# Patient Record
Sex: MALE | Race: WHITE | NOT HISPANIC OR LATINO
[De-identification: names, ages, dates, MRNs, and addresses within clinical notes are randomized per-mention and may not be internally consistent; named-entity substitution may affect disease eponyms.]

---

## 2017-03-14 ENCOUNTER — APPOINTMENT (OUTPATIENT)
Dept: GERIATRICS | Facility: CLINIC | Age: 82
End: 2017-03-14

## 2017-04-12 ENCOUNTER — RX RENEWAL (OUTPATIENT)
Age: 82
End: 2017-04-12

## 2017-05-13 ENCOUNTER — RX RENEWAL (OUTPATIENT)
Age: 82
End: 2017-05-13

## 2017-09-25 ENCOUNTER — APPOINTMENT (OUTPATIENT)
Dept: DERMATOLOGY | Facility: CLINIC | Age: 82
End: 2017-09-25
Payer: MEDICARE

## 2017-09-25 VITALS — DIASTOLIC BLOOD PRESSURE: 68 MMHG | SYSTOLIC BLOOD PRESSURE: 112 MMHG

## 2017-09-25 DIAGNOSIS — Z56.0 UNEMPLOYMENT, UNSPECIFIED: ICD-10-CM

## 2017-09-25 DIAGNOSIS — H54.7 UNSPECIFIED VISUAL LOSS: ICD-10-CM

## 2017-09-25 DIAGNOSIS — Z87.442 PERSONAL HISTORY OF URINARY CALCULI: ICD-10-CM

## 2017-09-25 DIAGNOSIS — Z91.89 OTHER SPECIFIED PERSONAL RISK FACTORS, NOT ELSEWHERE CLASSIFIED: ICD-10-CM

## 2017-09-25 PROCEDURE — 99203 OFFICE O/P NEW LOW 30 MIN: CPT | Mod: GC

## 2017-09-25 SDOH — ECONOMIC STABILITY - INCOME SECURITY: UNEMPLOYMENT, UNSPECIFIED: Z56.0

## 2017-10-03 ENCOUNTER — APPOINTMENT (OUTPATIENT)
Dept: GERIATRICS | Facility: CLINIC | Age: 82
End: 2017-10-03
Payer: MEDICARE

## 2017-10-03 ENCOUNTER — NON-APPOINTMENT (OUTPATIENT)
Age: 82
End: 2017-10-03

## 2017-10-03 VITALS
RESPIRATION RATE: 18 BRPM | SYSTOLIC BLOOD PRESSURE: 110 MMHG | DIASTOLIC BLOOD PRESSURE: 70 MMHG | HEART RATE: 84 BPM | BODY MASS INDEX: 29.76 KG/M2 | TEMPERATURE: 99.2 F | WEIGHT: 168 LBS | OXYGEN SATURATION: 94 %

## 2017-10-03 DIAGNOSIS — G47.00 INSOMNIA, UNSPECIFIED: ICD-10-CM

## 2017-10-03 PROCEDURE — 93000 ELECTROCARDIOGRAM COMPLETE: CPT

## 2017-10-03 PROCEDURE — G0439: CPT

## 2017-10-03 PROCEDURE — 99214 OFFICE O/P EST MOD 30 MIN: CPT | Mod: 25

## 2017-10-03 RX ORDER — DOCUSATE SODIUM 100 MG/1
100 CAPSULE ORAL
Refills: 0 | Status: ACTIVE | COMMUNITY
Start: 2017-10-03

## 2017-10-03 RX ORDER — MULTIVIT-MIN/FOLIC/VIT K/LYCOP 400-300MCG
25 MCG TABLET ORAL
Refills: 0 | Status: ACTIVE | COMMUNITY
Start: 2017-10-03

## 2017-10-03 RX ORDER — ACETAMINOPHEN 500 MG/1
500 TABLET, COATED ORAL
Refills: 0 | Status: ACTIVE | COMMUNITY
Start: 2017-10-03

## 2017-10-05 LAB
ALBUMIN SERPL ELPH-MCNC: 3.7 G/DL
ALP BLD-CCNC: 156 U/L
ALT SERPL-CCNC: 43 U/L
ANION GAP SERPL CALC-SCNC: 11 MMOL/L
AST SERPL-CCNC: 35 U/L
BASOPHILS # BLD AUTO: 0.06 K/UL
BASOPHILS NFR BLD AUTO: 0.8 %
BILIRUB SERPL-MCNC: 0.6 MG/DL
BUN SERPL-MCNC: 34 MG/DL
CALCIUM SERPL-MCNC: 10.1 MG/DL
CHLORIDE SERPL-SCNC: 102 MMOL/L
CHOLEST SERPL-MCNC: 146 MG/DL
CHOLEST/HDLC SERPL: 2.7 RATIO
CO2 SERPL-SCNC: 25 MMOL/L
CREAT SERPL-MCNC: 1.74 MG/DL
EOSINOPHIL # BLD AUTO: 0.57 K/UL
EOSINOPHIL NFR BLD AUTO: 8.1 %
FOLATE SERPL-MCNC: 19 NG/ML
GLUCOSE SERPL-MCNC: 125 MG/DL
HCT VFR BLD CALC: 39.6 %
HDLC SERPL-MCNC: 55 MG/DL
HGB BLD-MCNC: 13.1 G/DL
IMM GRANULOCYTES NFR BLD AUTO: 0.3 %
LDLC SERPL CALC-MCNC: 53 MG/DL
LYMPHOCYTES # BLD AUTO: 1.66 K/UL
LYMPHOCYTES NFR BLD AUTO: 23.4 %
MAN DIFF?: NORMAL
MCHC RBC-ENTMCNC: 33.1 GM/DL
MCHC RBC-ENTMCNC: 33.9 PG
MCV RBC AUTO: 102.6 FL
MONOCYTES # BLD AUTO: 0.76 K/UL
MONOCYTES NFR BLD AUTO: 10.7 %
NEUTROPHILS # BLD AUTO: 4.01 K/UL
NEUTROPHILS NFR BLD AUTO: 56.7 %
PLATELET # BLD AUTO: 284 K/UL
POTASSIUM SERPL-SCNC: 4.6 MMOL/L
PROT SERPL-MCNC: 6.7 G/DL
RBC # BLD: 3.86 M/UL
RBC # FLD: 14.2 %
SODIUM SERPL-SCNC: 138 MMOL/L
TRIGL SERPL-MCNC: 189 MG/DL
TSH SERPL-ACNC: 2.67 UIU/ML
VIT B12 SERPL-MCNC: 702 PG/ML
WBC # FLD AUTO: 7.08 K/UL

## 2017-10-06 ENCOUNTER — EMERGENCY (EMERGENCY)
Facility: HOSPITAL | Age: 82
LOS: 1 days | Discharge: HOME | End: 2017-10-06
Attending: EMERGENCY MEDICINE | Admitting: EMERGENCY MEDICINE
Payer: MEDICARE

## 2017-10-06 VITALS
DIASTOLIC BLOOD PRESSURE: 88 MMHG | SYSTOLIC BLOOD PRESSURE: 133 MMHG | RESPIRATION RATE: 18 BRPM | OXYGEN SATURATION: 95 % | TEMPERATURE: 98 F | HEART RATE: 86 BPM

## 2017-10-06 VITALS
OXYGEN SATURATION: 98 % | DIASTOLIC BLOOD PRESSURE: 76 MMHG | SYSTOLIC BLOOD PRESSURE: 145 MMHG | RESPIRATION RATE: 20 BRPM | HEART RATE: 84 BPM

## 2017-10-06 PROCEDURE — 99284 EMERGENCY DEPT VISIT MOD MDM: CPT | Mod: GC

## 2017-10-06 PROCEDURE — 72170 X-RAY EXAM OF PELVIS: CPT

## 2017-10-06 PROCEDURE — 99284 EMERGENCY DEPT VISIT MOD MDM: CPT | Mod: 25

## 2017-10-06 PROCEDURE — 70450 CT HEAD/BRAIN W/O DYE: CPT | Mod: 26

## 2017-10-06 PROCEDURE — 70450 CT HEAD/BRAIN W/O DYE: CPT

## 2017-10-06 PROCEDURE — 72170 X-RAY EXAM OF PELVIS: CPT | Mod: 26

## 2017-10-06 PROCEDURE — 71010: CPT | Mod: 26

## 2017-10-06 PROCEDURE — 71045 X-RAY EXAM CHEST 1 VIEW: CPT

## 2017-10-06 NOTE — ED PROVIDER NOTE - MEDICAL DECISION MAKING DETAILS
S/p mechanical fall on aspirin Pt ambulatory. Will get CTH and CXR and pelvic Xray. S/p mechanical fall on aspirin Pt ambulatory. Will get CTH and CXR and pelvic Xray.  ATTD: s/p fall with unknown injury. denies pain. will check ct head as patient on ASA and concern for ICH, check xray chest xray pelvis,

## 2017-10-06 NOTE — ED PROVIDER NOTE - OBJECTIVE STATEMENT
89 YOM on aspirin arrives via EMS s/p hearing a "thud" on the ground possibly pt fell out of bed and was complaining of hip pain after the fall. Pt remembers falling while trying to get out of bed. No loc, no syncope pt states he slipped and fell. Pt usually uses a walker to walk. Pt denies any fevers, chills, pain, SOB. Chest pain, SOB. Pt is currently able to ambulate with assistance.

## 2017-10-06 NOTE — ED ADULT NURSE REASSESSMENT NOTE - NS ED NURSE REASSESS COMMENT FT1
Pt returned from CT to room and awaiting radiology results, MD aware. VS stable, skin intact, IV site benign. Warm blanket provided and lights dimmed. Will continue to monitor.

## 2017-10-06 NOTE — ED PROVIDER NOTE - ATTENDING CONTRIBUTION TO CARE
89 y/o m with pmhx HLD, dementia, presents from [facility]isted [facility] for fall this am from bed. states he was getting up to go to the bathroom and then fell. not amb after the fall. unknown if LOC. no weakness or numbness. no headache. no fever. no chills. no back pain no pain in extremities.   Gen. elderly male, no apparent resp distress, no acute distress from pain, Non toxic   HEENT: EOMI, mmm, nc/at. no hematoma, neck no midline tend. no step off. no racoon no patel sign.   Lungs:  b/l BS. no wheeze no retractions.   CVS: S1S2   back small area of ecchymosis on left upper back. no scapula tenderness. ext  from.   MSK 5/5 x4.   Abd; Soft non tender, non distended   Ext: no edema   Neuro Awake, alert, oriented to person , place and age.  non focal clear speech  able to ambulate in ER with assistance. distal neurovasc intact.

## 2017-10-06 NOTE — ED ADULT NURSE NOTE - OBJECTIVE STATEMENT
Patient a + o x 3 s/p fall from bed, lives at assisted living and someone heard the patient fall out of bed.   patient denies LOC, denies hitting his head. denies right and left hip pain.  Patient has bilateral upper and lower extremity strength and able to lift both legs.  patient states that he uses a walker at home.  Patient oriented to area, made comfortable, safety maintained.

## 2017-10-10 ENCOUNTER — OTHER (OUTPATIENT)
Age: 82
End: 2017-10-10

## 2017-10-10 DIAGNOSIS — E55.9 VITAMIN D DEFICIENCY, UNSPECIFIED: ICD-10-CM

## 2017-10-10 RX ORDER — NICOTINE POLACRILEX 2 MG
600-500 GUM BUCCAL
Qty: 60 | Refills: 5 | Status: ACTIVE | COMMUNITY
Start: 2017-10-10

## 2017-10-10 RX ORDER — MULTIVITAMIN WITH MINERALS
600 TABLET ORAL
Refills: 0 | Status: DISCONTINUED | COMMUNITY
Start: 2017-10-03 | End: 2017-10-10

## 2017-11-15 ENCOUNTER — APPOINTMENT (OUTPATIENT)
Dept: GASTROENTEROLOGY | Facility: CLINIC | Age: 82
End: 2017-11-15

## 2017-11-16 ENCOUNTER — NON-APPOINTMENT (OUTPATIENT)
Age: 82
End: 2017-11-16

## 2017-11-16 ENCOUNTER — APPOINTMENT (OUTPATIENT)
Dept: ELECTROPHYSIOLOGY | Facility: CLINIC | Age: 82
End: 2017-11-16
Payer: MEDICARE

## 2017-11-16 VITALS
BODY MASS INDEX: 26.58 KG/M2 | HEART RATE: 90 BPM | DIASTOLIC BLOOD PRESSURE: 88 MMHG | WEIGHT: 150 LBS | SYSTOLIC BLOOD PRESSURE: 138 MMHG | HEIGHT: 63 IN | OXYGEN SATURATION: 94 %

## 2017-11-16 PROCEDURE — 93291 INTERROG DEV EVAL SCRMS IP: CPT

## 2017-11-20 ENCOUNTER — APPOINTMENT (OUTPATIENT)
Dept: DERMATOLOGY | Facility: CLINIC | Age: 82
End: 2017-11-20

## 2017-12-01 ENCOUNTER — APPOINTMENT (OUTPATIENT)
Dept: DERMATOLOGY | Facility: CLINIC | Age: 82
End: 2017-12-01
Payer: MEDICARE

## 2017-12-01 VITALS — SYSTOLIC BLOOD PRESSURE: 110 MMHG | DIASTOLIC BLOOD PRESSURE: 70 MMHG

## 2017-12-01 DIAGNOSIS — L30.4 ERYTHEMA INTERTRIGO: ICD-10-CM

## 2017-12-01 PROCEDURE — 99213 OFFICE O/P EST LOW 20 MIN: CPT | Mod: GC

## 2017-12-01 RX ORDER — NYSTATIN AND TRIAMCINOLONE ACETONIDE 100000; 1 MG/G; MG/G
100000-0.1 CREAM TOPICAL
Qty: 60 | Refills: 0 | Status: ACTIVE | COMMUNITY
Start: 2017-08-29

## 2017-12-01 RX ORDER — BUPROPION HYDROCHLORIDE 100 MG/1
100 TABLET, FILM COATED, EXTENDED RELEASE ORAL
Qty: 30 | Refills: 0 | Status: ACTIVE | COMMUNITY
Start: 2017-07-24

## 2017-12-04 ENCOUNTER — NON-APPOINTMENT (OUTPATIENT)
Age: 82
End: 2017-12-04

## 2017-12-04 ENCOUNTER — APPOINTMENT (OUTPATIENT)
Dept: CARDIOLOGY | Facility: CLINIC | Age: 82
End: 2017-12-04
Payer: MEDICARE

## 2017-12-04 VITALS
OXYGEN SATURATION: 93 % | DIASTOLIC BLOOD PRESSURE: 81 MMHG | WEIGHT: 167 LBS | HEIGHT: 63 IN | BODY MASS INDEX: 29.59 KG/M2 | HEART RATE: 64 BPM | SYSTOLIC BLOOD PRESSURE: 130 MMHG

## 2017-12-04 PROCEDURE — 99204 OFFICE O/P NEW MOD 45 MIN: CPT

## 2017-12-04 PROCEDURE — 93000 ELECTROCARDIOGRAM COMPLETE: CPT

## 2017-12-04 RX ORDER — APIXABAN 5 MG/1
5 TABLET, FILM COATED ORAL
Qty: 60 | Refills: 0 | Status: DISCONTINUED | COMMUNITY
Start: 2017-06-28 | End: 2017-12-04

## 2017-12-05 ENCOUNTER — MEDICATION RENEWAL (OUTPATIENT)
Age: 82
End: 2017-12-05

## 2017-12-15 ENCOUNTER — APPOINTMENT (OUTPATIENT)
Dept: ELECTROPHYSIOLOGY | Facility: CLINIC | Age: 82
End: 2017-12-15

## 2017-12-19 ENCOUNTER — APPOINTMENT (OUTPATIENT)
Dept: ELECTROPHYSIOLOGY | Facility: CLINIC | Age: 82
End: 2017-12-19

## 2018-01-23 ENCOUNTER — APPOINTMENT (OUTPATIENT)
Dept: ELECTROPHYSIOLOGY | Facility: CLINIC | Age: 83
End: 2018-01-23

## 2018-01-26 DIAGNOSIS — R19.7 DIARRHEA, UNSPECIFIED: ICD-10-CM

## 2018-01-30 PROBLEM — R19.7 DIARRHEA: Status: ACTIVE | Noted: 2018-01-30

## 2018-02-06 ENCOUNTER — APPOINTMENT (OUTPATIENT)
Dept: GERIATRICS | Facility: CLINIC | Age: 83
End: 2018-02-06
Payer: MEDICARE

## 2018-02-06 VITALS
HEIGHT: 63 IN | WEIGHT: 154 LBS | SYSTOLIC BLOOD PRESSURE: 122 MMHG | DIASTOLIC BLOOD PRESSURE: 64 MMHG | OXYGEN SATURATION: 93 % | BODY MASS INDEX: 27.29 KG/M2 | HEART RATE: 70 BPM | RESPIRATION RATE: 18 BRPM | TEMPERATURE: 98.4 F

## 2018-02-06 DIAGNOSIS — R13.10 DYSPHAGIA, UNSPECIFIED: ICD-10-CM

## 2018-02-06 DIAGNOSIS — R41.89 OTHER SYMPTOMS AND SIGNS INVOLVING COGNITIVE FUNCTIONS AND AWARENESS: ICD-10-CM

## 2018-02-06 DIAGNOSIS — F32.9 MAJOR DEPRESSIVE DISORDER, SINGLE EPISODE, UNSPECIFIED: ICD-10-CM

## 2018-02-06 PROCEDURE — 99214 OFFICE O/P EST MOD 30 MIN: CPT | Mod: 25,GC

## 2018-02-06 PROCEDURE — 99496 TRANSJ CARE MGMT HIGH F2F 7D: CPT

## 2018-02-09 PROBLEM — R13.10 DYSPHAGIA: Status: ACTIVE | Noted: 2018-02-09

## 2018-02-27 ENCOUNTER — MEDICATION RENEWAL (OUTPATIENT)
Age: 83
End: 2018-02-27

## 2018-02-27 ENCOUNTER — APPOINTMENT (OUTPATIENT)
Dept: ELECTROPHYSIOLOGY | Facility: CLINIC | Age: 83
End: 2018-02-27
Payer: MEDICARE

## 2018-02-27 PROCEDURE — 93298 REM INTERROG DEV EVAL SCRMS: CPT

## 2018-02-27 PROCEDURE — 93299: CPT

## 2018-03-06 RX ORDER — ESCITALOPRAM OXALATE 10 MG/1
10 TABLET ORAL DAILY
Refills: 0 | Status: ACTIVE | COMMUNITY
Start: 2018-03-06

## 2018-03-09 ENCOUNTER — OTHER (OUTPATIENT)
Age: 83
End: 2018-03-09

## 2018-03-09 ENCOUNTER — RX RENEWAL (OUTPATIENT)
Age: 83
End: 2018-03-09

## 2018-03-09 RX ORDER — KETOCONAZOLE 20 MG/G
2 CREAM TOPICAL TWICE DAILY
Qty: 1 | Refills: 5 | Status: ACTIVE | OUTPATIENT
Start: 2017-09-25

## 2018-03-29 PROBLEM — M25.551 HIP PAIN, BILATERAL: Status: ACTIVE | Noted: 2018-03-29

## 2018-03-29 RX ORDER — ACETAMINOPHEN 500 MG/1
500 TABLET, COATED ORAL
Qty: 120 | Refills: 3 | Status: ACTIVE | COMMUNITY
Start: 2018-03-29 | End: 1900-01-01

## 2018-04-04 ENCOUNTER — APPOINTMENT (OUTPATIENT)
Dept: ELECTROPHYSIOLOGY | Facility: CLINIC | Age: 83
End: 2018-04-04
Payer: MEDICARE

## 2018-04-04 DIAGNOSIS — M25.551 PAIN IN RIGHT HIP: ICD-10-CM

## 2018-04-04 DIAGNOSIS — M25.552 PAIN IN RIGHT HIP: ICD-10-CM

## 2018-04-04 PROCEDURE — 93299: CPT

## 2018-04-04 PROCEDURE — 93298 REM INTERROG DEV EVAL SCRMS: CPT

## 2018-04-05 ENCOUNTER — RX RENEWAL (OUTPATIENT)
Age: 83
End: 2018-04-05

## 2018-04-05 RX ORDER — ESCITALOPRAM OXALATE 5 MG/1
5 TABLET ORAL
Qty: 30 | Refills: 3 | Status: ACTIVE | COMMUNITY
Start: 2017-10-03 | End: 1900-01-01

## 2018-04-12 ENCOUNTER — RX RENEWAL (OUTPATIENT)
Age: 83
End: 2018-04-12

## 2018-05-08 RX ORDER — PANTOPRAZOLE 40 MG/1
40 TABLET, DELAYED RELEASE ORAL
Qty: 30 | Refills: 3 | Status: ACTIVE | COMMUNITY
Start: 2018-02-06 | End: 1900-01-01

## 2018-05-15 ENCOUNTER — APPOINTMENT (OUTPATIENT)
Dept: GERIATRICS | Facility: CLINIC | Age: 83
End: 2018-05-15
Payer: MEDICARE

## 2018-05-15 ENCOUNTER — NON-APPOINTMENT (OUTPATIENT)
Age: 83
End: 2018-05-15

## 2018-05-15 VITALS
HEIGHT: 63.6 IN | DIASTOLIC BLOOD PRESSURE: 60 MMHG | WEIGHT: 180 LBS | RESPIRATION RATE: 18 BRPM | SYSTOLIC BLOOD PRESSURE: 110 MMHG | HEART RATE: 65 BPM | OXYGEN SATURATION: 94 % | BODY MASS INDEX: 31.11 KG/M2

## 2018-05-15 DIAGNOSIS — E78.5 HYPERLIPIDEMIA, UNSPECIFIED: ICD-10-CM

## 2018-05-15 DIAGNOSIS — R06.02 SHORTNESS OF BREATH: ICD-10-CM

## 2018-05-15 DIAGNOSIS — K21.9 GASTRO-ESOPHAGEAL REFLUX DISEASE W/OUT ESOPHAGITIS: ICD-10-CM

## 2018-05-15 DIAGNOSIS — I10 ESSENTIAL (PRIMARY) HYPERTENSION: ICD-10-CM

## 2018-05-15 DIAGNOSIS — R63.5 ABNORMAL WEIGHT GAIN: ICD-10-CM

## 2018-05-15 DIAGNOSIS — D64.9 ANEMIA, UNSPECIFIED: ICD-10-CM

## 2018-05-15 DIAGNOSIS — R14.0 ABDOMINAL DISTENSION (GASEOUS): ICD-10-CM

## 2018-05-15 LAB
ALBUMIN SERPL ELPH-MCNC: 3.7 G/DL
ALP BLD-CCNC: 72 U/L
ALT SERPL-CCNC: 9 U/L
ANION GAP SERPL CALC-SCNC: 14 MMOL/L
AST SERPL-CCNC: 16 U/L
BASOPHILS # BLD AUTO: 0.09 K/UL
BASOPHILS NFR BLD AUTO: 1.3 %
BILIRUB SERPL-MCNC: 0.5 MG/DL
BUN SERPL-MCNC: 38 MG/DL
CALCIUM SERPL-MCNC: 10.2 MG/DL
CHLORIDE SERPL-SCNC: 106 MMOL/L
CHOLEST SERPL-MCNC: 154 MG/DL
CHOLEST/HDLC SERPL: 3.6 RATIO
CO2 SERPL-SCNC: 20 MMOL/L
CREAT SERPL-MCNC: 1.85 MG/DL
EOSINOPHIL # BLD AUTO: 0.34 K/UL
EOSINOPHIL NFR BLD AUTO: 4.7 %
ERYTHROCYTE [SEDIMENTATION RATE] IN BLOOD BY WESTERGREN METHOD: 33 MM/HR
GLUCOSE SERPL-MCNC: 135 MG/DL
HCT VFR BLD CALC: 42.9 %
HDLC SERPL-MCNC: 43 MG/DL
HGB BLD-MCNC: 14.1 G/DL
IMM GRANULOCYTES NFR BLD AUTO: 0.3 %
LDLC SERPL CALC-MCNC: 69 MG/DL
LYMPHOCYTES # BLD AUTO: 1.74 K/UL
LYMPHOCYTES NFR BLD AUTO: 24.3 %
MAN DIFF?: NORMAL
MCHC RBC-ENTMCNC: 32.9 GM/DL
MCHC RBC-ENTMCNC: 34 PG
MCV RBC AUTO: 103.4 FL
MONOCYTES # BLD AUTO: 0.73 K/UL
MONOCYTES NFR BLD AUTO: 10.2 %
NEUTROPHILS # BLD AUTO: 4.24 K/UL
NEUTROPHILS NFR BLD AUTO: 59.2 %
PLATELET # BLD AUTO: 249 K/UL
POTASSIUM SERPL-SCNC: 4.7 MMOL/L
PROT SERPL-MCNC: 6.8 G/DL
RBC # BLD: 4.15 M/UL
RBC # FLD: 14.2 %
SODIUM SERPL-SCNC: 140 MMOL/L
TRIGL SERPL-MCNC: 208 MG/DL
TSH SERPL-ACNC: 3.91 UIU/ML
WBC # FLD AUTO: 7.16 K/UL

## 2018-05-15 PROCEDURE — 99214 OFFICE O/P EST MOD 30 MIN: CPT

## 2018-05-15 RX ORDER — AZITHROMYCIN 500 MG/1
500 TABLET, FILM COATED ORAL
Qty: 1 | Refills: 0 | Status: DISCONTINUED | COMMUNITY
Start: 2018-01-25

## 2018-05-15 RX ORDER — OSELTAMIVIR PHOSPHATE 75 MG/1
75 CAPSULE ORAL
Qty: 1 | Refills: 0 | Status: DISCONTINUED | COMMUNITY
Start: 2018-01-26

## 2018-05-15 RX ORDER — AMOXICILLIN AND CLAVULANATE POTASSIUM 875; 125 MG/1; MG/1
875-125 TABLET, COATED ORAL
Qty: 4 | Refills: 0 | Status: DISCONTINUED | COMMUNITY
Start: 2018-01-26

## 2018-05-16 ENCOUNTER — RX RENEWAL (OUTPATIENT)
Age: 83
End: 2018-05-16

## 2018-05-16 RX ORDER — FUROSEMIDE 20 MG/1
20 TABLET ORAL
Qty: 30 | Refills: 0 | Status: DISCONTINUED | COMMUNITY
Start: 2018-05-15 | End: 2018-05-16

## 2018-05-17 ENCOUNTER — APPOINTMENT (OUTPATIENT)
Dept: ELECTROPHYSIOLOGY | Facility: CLINIC | Age: 83
End: 2018-05-17
Payer: MEDICARE

## 2018-05-17 PROCEDURE — 93299: CPT

## 2018-05-17 PROCEDURE — 93298 REM INTERROG DEV EVAL SCRMS: CPT

## 2018-05-17 RX ORDER — FUROSEMIDE 20 MG/1
20 TABLET ORAL
Qty: 90 | Refills: 3 | Status: ACTIVE | OUTPATIENT
Start: 2018-05-16

## 2018-05-22 ENCOUNTER — FORM ENCOUNTER (OUTPATIENT)
Age: 83
End: 2018-05-22

## 2018-05-23 ENCOUNTER — APPOINTMENT (OUTPATIENT)
Dept: CT IMAGING | Facility: CLINIC | Age: 83
End: 2018-05-23
Payer: MEDICARE

## 2018-05-23 ENCOUNTER — OUTPATIENT (OUTPATIENT)
Dept: OUTPATIENT SERVICES | Facility: HOSPITAL | Age: 83
LOS: 1 days | End: 2018-05-23
Payer: MEDICARE

## 2018-05-23 DIAGNOSIS — R14.0 ABDOMINAL DISTENSION (GASEOUS): ICD-10-CM

## 2018-05-23 PROCEDURE — 74176 CT ABD & PELVIS W/O CONTRAST: CPT | Mod: 26

## 2018-05-23 PROCEDURE — 71250 CT THORAX DX C-: CPT

## 2018-05-23 PROCEDURE — 74176 CT ABD & PELVIS W/O CONTRAST: CPT

## 2018-05-23 PROCEDURE — 71250 CT THORAX DX C-: CPT | Mod: 26

## 2018-05-30 ENCOUNTER — APPOINTMENT (OUTPATIENT)
Dept: CARDIOLOGY | Facility: CLINIC | Age: 83
End: 2018-05-30
Payer: MEDICARE

## 2018-05-30 DIAGNOSIS — R06.00 DYSPNEA, UNSPECIFIED: ICD-10-CM

## 2018-05-30 PROCEDURE — 93306 TTE W/DOPPLER COMPLETE: CPT

## 2018-05-31 PROBLEM — R06.00 DYSPNEA, UNSPECIFIED: Status: ACTIVE | Noted: 2018-05-31

## 2018-06-05 ENCOUNTER — APPOINTMENT (OUTPATIENT)
Dept: GERIATRICS | Facility: CLINIC | Age: 83
End: 2018-06-05

## 2018-06-11 ENCOUNTER — RX RENEWAL (OUTPATIENT)
Age: 83
End: 2018-06-11

## 2018-06-11 RX ORDER — FENOFIBRATE 160 MG/1
160 TABLET ORAL
Qty: 90 | Refills: 1 | Status: ACTIVE | COMMUNITY
Start: 2017-10-03 | End: 1900-01-01

## 2018-06-15 ENCOUNTER — APPOINTMENT (OUTPATIENT)
Dept: UROLOGY | Facility: CLINIC | Age: 83
End: 2018-06-15
Payer: MEDICARE

## 2018-06-15 DIAGNOSIS — Z87.442 PERSONAL HISTORY OF URINARY CALCULI: ICD-10-CM

## 2018-06-15 DIAGNOSIS — C61 MALIGNANT NEOPLASM OF PROSTATE: ICD-10-CM

## 2018-06-15 DIAGNOSIS — R54 AGE-RELATED PHYSICAL DEBILITY: ICD-10-CM

## 2018-06-15 DIAGNOSIS — N13.2 HYDRONEPHROSIS WITH RENAL AND URETERAL CALCULOUS OBSTRUCTION: ICD-10-CM

## 2018-06-15 DIAGNOSIS — N20.1 CALCULUS OF URETER: ICD-10-CM

## 2018-06-15 DIAGNOSIS — N20.0 CALCULUS OF KIDNEY: ICD-10-CM

## 2018-06-15 PROCEDURE — 99214 OFFICE O/P EST MOD 30 MIN: CPT

## 2018-06-18 LAB — BACTERIA UR CULT: NORMAL

## 2018-06-20 ENCOUNTER — APPOINTMENT (OUTPATIENT)
Dept: ELECTROPHYSIOLOGY | Facility: CLINIC | Age: 83
End: 2018-06-20
Payer: MEDICARE

## 2018-06-20 DIAGNOSIS — I48.91 UNSPECIFIED ATRIAL FIBRILLATION: ICD-10-CM

## 2018-06-20 PROCEDURE — 93298 REM INTERROG DEV EVAL SCRMS: CPT

## 2018-06-20 PROCEDURE — 93299: CPT

## 2018-06-21 PROBLEM — R54 FRAILTY: Status: ACTIVE | Noted: 2018-06-21

## 2018-07-03 ENCOUNTER — EMERGENCY (EMERGENCY)
Facility: HOSPITAL | Age: 83
LOS: 1 days | Discharge: HOME | End: 2018-07-03
Attending: EMERGENCY MEDICINE
Payer: MEDICARE

## 2018-07-03 VITALS
TEMPERATURE: 98 F | DIASTOLIC BLOOD PRESSURE: 75 MMHG | SYSTOLIC BLOOD PRESSURE: 109 MMHG | RESPIRATION RATE: 18 BRPM | HEART RATE: 59 BPM | OXYGEN SATURATION: 95 %

## 2018-07-03 LAB
ALBUMIN SERPL ELPH-MCNC: 3.3 G/DL — SIGNIFICANT CHANGE UP (ref 3.3–5)
ALP SERPL-CCNC: 54 U/L — SIGNIFICANT CHANGE UP (ref 40–120)
ALT FLD-CCNC: 12 U/L — SIGNIFICANT CHANGE UP (ref 10–45)
ANION GAP SERPL CALC-SCNC: 12 MMOL/L — SIGNIFICANT CHANGE UP (ref 5–17)
APTT BLD: 27.1 SEC — LOW (ref 27.5–37.4)
AST SERPL-CCNC: 24 U/L — SIGNIFICANT CHANGE UP (ref 10–40)
BASOPHILS # BLD AUTO: 0.1 K/UL — SIGNIFICANT CHANGE UP (ref 0–0.2)
BASOPHILS NFR BLD AUTO: 1 % — SIGNIFICANT CHANGE UP (ref 0–2)
BILIRUB SERPL-MCNC: 0.4 MG/DL — SIGNIFICANT CHANGE UP (ref 0.2–1.2)
BUN SERPL-MCNC: 27 MG/DL — HIGH (ref 7–23)
CALCIUM SERPL-MCNC: 9.8 MG/DL — SIGNIFICANT CHANGE UP (ref 8.4–10.5)
CHLORIDE SERPL-SCNC: 101 MMOL/L — SIGNIFICANT CHANGE UP (ref 96–108)
CO2 SERPL-SCNC: 23 MMOL/L — SIGNIFICANT CHANGE UP (ref 22–31)
CREAT SERPL-MCNC: 1.78 MG/DL — HIGH (ref 0.5–1.3)
EOSINOPHIL # BLD AUTO: 0.3 K/UL — SIGNIFICANT CHANGE UP (ref 0–0.5)
EOSINOPHIL NFR BLD AUTO: 3.8 % — SIGNIFICANT CHANGE UP (ref 0–6)
GAS PNL BLDV: SIGNIFICANT CHANGE UP
GLUCOSE SERPL-MCNC: 104 MG/DL — HIGH (ref 70–99)
HCT VFR BLD CALC: 44.1 % — SIGNIFICANT CHANGE UP (ref 39–50)
HGB BLD-MCNC: 14.8 G/DL — SIGNIFICANT CHANGE UP (ref 13–17)
INR BLD: 1.14 RATIO — SIGNIFICANT CHANGE UP (ref 0.88–1.16)
LYMPHOCYTES # BLD AUTO: 1.6 K/UL — SIGNIFICANT CHANGE UP (ref 1–3.3)
LYMPHOCYTES # BLD AUTO: 20 % — SIGNIFICANT CHANGE UP (ref 13–44)
MCHC RBC-ENTMCNC: 33.6 GM/DL — SIGNIFICANT CHANGE UP (ref 32–36)
MCHC RBC-ENTMCNC: 34.7 PG — HIGH (ref 27–34)
MCV RBC AUTO: 103 FL — HIGH (ref 80–100)
MONOCYTES # BLD AUTO: 1.1 K/UL — HIGH (ref 0–0.9)
MONOCYTES NFR BLD AUTO: 13.6 % — SIGNIFICANT CHANGE UP (ref 2–14)
NEUTROPHILS # BLD AUTO: 4.8 K/UL — SIGNIFICANT CHANGE UP (ref 1.8–7.4)
NEUTROPHILS NFR BLD AUTO: 61.6 % — SIGNIFICANT CHANGE UP (ref 43–77)
PLATELET # BLD AUTO: 310 K/UL — SIGNIFICANT CHANGE UP (ref 150–400)
POTASSIUM SERPL-MCNC: 4.8 MMOL/L — SIGNIFICANT CHANGE UP (ref 3.5–5.3)
POTASSIUM SERPL-SCNC: 4.8 MMOL/L — SIGNIFICANT CHANGE UP (ref 3.5–5.3)
PROT SERPL-MCNC: 7.4 G/DL — SIGNIFICANT CHANGE UP (ref 6–8.3)
PROTHROM AB SERPL-ACNC: 12.3 SEC — SIGNIFICANT CHANGE UP (ref 9.8–12.7)
RBC # BLD: 4.26 M/UL — SIGNIFICANT CHANGE UP (ref 4.2–5.8)
RBC # FLD: 11.8 % — SIGNIFICANT CHANGE UP (ref 10.3–14.5)
SODIUM SERPL-SCNC: 136 MMOL/L — SIGNIFICANT CHANGE UP (ref 135–145)
TROPONIN T, HIGH SENSITIVITY RESULT: 17 NG/L — SIGNIFICANT CHANGE UP (ref 0–51)
WBC # BLD: 7.8 K/UL — SIGNIFICANT CHANGE UP (ref 3.8–10.5)
WBC # FLD AUTO: 7.8 K/UL — SIGNIFICANT CHANGE UP (ref 3.8–10.5)

## 2018-07-03 PROCEDURE — 85730 THROMBOPLASTIN TIME PARTIAL: CPT

## 2018-07-03 PROCEDURE — 71046 X-RAY EXAM CHEST 2 VIEWS: CPT

## 2018-07-03 PROCEDURE — 84484 ASSAY OF TROPONIN QUANT: CPT

## 2018-07-03 PROCEDURE — 93005 ELECTROCARDIOGRAM TRACING: CPT

## 2018-07-03 PROCEDURE — 99285 EMERGENCY DEPT VISIT HI MDM: CPT | Mod: 25,GC

## 2018-07-03 PROCEDURE — 85027 COMPLETE CBC AUTOMATED: CPT

## 2018-07-03 PROCEDURE — 80053 COMPREHEN METABOLIC PANEL: CPT

## 2018-07-03 PROCEDURE — 93010 ELECTROCARDIOGRAM REPORT: CPT

## 2018-07-03 PROCEDURE — 85610 PROTHROMBIN TIME: CPT

## 2018-07-03 PROCEDURE — 99283 EMERGENCY DEPT VISIT LOW MDM: CPT | Mod: 25

## 2018-07-03 PROCEDURE — 71046 X-RAY EXAM CHEST 2 VIEWS: CPT | Mod: 26

## 2018-07-03 RX ORDER — SODIUM CHLORIDE 9 MG/ML
1000 INJECTION INTRAMUSCULAR; INTRAVENOUS; SUBCUTANEOUS ONCE
Qty: 0 | Refills: 0 | Status: COMPLETED | OUTPATIENT
Start: 2018-07-03 | End: 2018-07-03

## 2018-07-03 RX ADMIN — SODIUM CHLORIDE 1000 MILLILITER(S): 9 INJECTION INTRAMUSCULAR; INTRAVENOUS; SUBCUTANEOUS at 18:21

## 2018-07-03 NOTE — ED PROVIDER NOTE - PROGRESS NOTE DETAILS
Bhavik ESPARZA: spoke to son Dr. Alden Page to discuss pt care. Discussed case with Dr. Gongora (pt's cardiologist) who notes that pt's bp systolic is typically 100-130 and his last echo was 6m ago which was normal. Rolan why pt is on metoprolol (son is also unsure) as pt was not on it during last visit. Recommends stopping metoprolol. states that his wife will pick him up -luis

## 2018-07-03 NOTE — ED PROVIDER NOTE - NS ED ROS FT
Constitutional: no fevers/chills, + weakness  HEENT: no visual changes, no sore throat, no rhinorrhea  CV: no cp  Resp: no sob  GI: no abd pain, n/v, diarrhea/constipation  : no dysuria, hematuria  MSK: no joint pains  skin: no rashes  neuro: no HA, no confusion

## 2018-07-03 NOTE — ED PROVIDER NOTE - PLAN OF CARE
Follow up with your medical doctor in 2-3 days or call our clinic at [phone] and state you were seen in the Emergency Department and would like to be seen in clinic.   Please stop taking your metoprolol.    Drink at least 2 Liters or 64 Ounces of water each day.  Return for any persistent, worsening symptoms, or ANY concerns at all.

## 2018-07-03 NOTE — ED ADULT NURSE REASSESSMENT NOTE - NS ED NURSE REASSESS COMMENT FT1
Report received from Mariluz WILSON. Currently awaiting pt. to give urine sample. VSS NAD. A&Ox4 gross neuro intact, lungs cta bilaterally, no difficulty speaking in complete sentences, s1s2 heart sounds heard, pulses x 4, roque x4, abdomen soft nontender nondistended, skin intact. Safety and comfort measures maintained.

## 2018-07-03 NOTE — ED ADULT NURSE NOTE - OBJECTIVE STATEMENT
89 yo male A&OX2 hx of dementia. Pt brought in by EMS, states that he was sent because low B/P. As per EMS pt's bp was 101/65 prior to arrival to the ED. Pt wife at bedside, states that patient has been having weakness, dizziness and fatigue for years. Pt currently denies pain, fevers, no chills. Lungs clear, equal b/l no cough and no sob. Abd round soft, non tender and non distended. As per wife pt bp has been getting low throughout the day. Pt denies no change in appitite. MAEX4, VSS

## 2018-07-03 NOTE — ED PROVIDER NOTE - PHYSICAL EXAMINATION
Vitals: WNL  Gen: laying comfortably in NAD  Head: NCAT  ENT: sclerae white, anicterus, dry mucous membranes. No exudates. Neck supple, no LAD,  no carotid bruits, no JVD  CV: RRR. Audible S1 and S2. No murmurs, rubs, gallops, S3, nor S4,  Pulm: Clear to auscultation bilaterally. No wheezes, rales, or rhonchi  Abd: soft, normoactive BS x4, NTND, no rebound, no guarding, no rashes  Musculoskeletal:  No peripheral edema, 5/5 strenght extremities x4, sensation equal and intact extremities x4  Skin: no lesions or scars noted  Neurologic: AAOx3

## 2018-07-03 NOTE — ED PROVIDER NOTE - OBJECTIVE STATEMENT
89yo M h/o syncope (which has been worked up with loop recorder, echo and numerous tests by dr. kaur but no known cause found), dementia (baseline able to have a conversation, AAOx3, no short term memory but with long term memory), progressive weakness over the last 6 years sent in from nursing home for low blood pressure and weakness. BP at the nursing home was 101/62 and per son, baseline is systolic 140. Pt denies lightheadedness, cp/sob, f/c, abd pain, melena, hematochezia, urinary symptoms (confirmed with son). Pt says he feels fine but is normally mostly bedbound and requires large amt of assistance with walking 2/2 weakness (over the last several months).

## 2018-07-03 NOTE — ED PROVIDER NOTE - CARE PLAN
Principal Discharge DX:	Hypotension  Assessment and plan of treatment:	Follow up with your medical doctor in 2-3 days or call our clinic at [phone] and state you were seen in the Emergency Department and would like to be seen in clinic.   Please stop taking your metoprolol.    Drink at least 2 Liters or 64 Ounces of water each day.  Return for any persistent, worsening symptoms, or ANY concerns at all.

## 2018-07-03 NOTE — ED PROVIDER NOTE - MEDICAL DECISION MAKING DETAILS
89yo M h/o syncope (which has been worked up with loop recorder, echo and numerous tests by dr. kaur but no known cause found), dementia (baseline able to have a conversation, AAOx3, no short term memory but with long term memory), progressive weakness over the last 6 years sent in from nursing home for low blood pressure and weakness. Pt with chronic progressive weakness likely 2/2 dementia. Pt looks well, reports he has no s/s LBP, no s/s sepsis. Will get basic labs, ekg, cxr. Pt hemodynamically stable. If neg, d/c to nursing home. 89yo M h/o syncope (which has been worked up with loop recorder, echo and numerous tests by dr. kaur but no known cause found), dementia (baseline able to have a conversation, AAOx3, no short term memory but with long term memory), progressive weakness over the last 6 years sent in from nursing home for low blood pressure and weakness. Pt with chronic progressive weakness likely 2/2 dementia. Pt looks well, reports he has no s/s LBP, no s/s sepsis. Will get basic labs, ekg, cxr. Pt hemodynamically stable. If neg, d/c to nursing home.    CRISTIANE Siegel MD: Pt is a 89 y/o male with PMH syncope (s/p prior w/u, loop recorder, echo and numerous tests by dr. kaur but no known cause found), dementia (baseline able to have a conversation, AAOx3, no short term memory but with long term memory), progressive weakness over the last 6 years sent in from nursing home dementia unit for low blood pressure and weakness. BP at the nursing home was 101/62 and per son, baseline is systolic 140. Pt denies lightheadedness, cp/sob, f/c, abd pain, melena, hematochezia, urinary symptoms (confirmed with son). Pt says he feels fine, is able to ambulate with assistance as per his baseline. Denies: F/C, CP, SOB, abd pain, HA, dizziness, urinary sx, trauma. Per wife, pt has been eating less than normal lately. DDx: dehydration, chronic functional decline, infectious/metabolic d/o. Plan: Basic labs, IVF, cxr, u/a, ucx, ekg, re-eval

## 2018-07-04 VITALS
RESPIRATION RATE: 17 BRPM | HEART RATE: 77 BPM | DIASTOLIC BLOOD PRESSURE: 86 MMHG | SYSTOLIC BLOOD PRESSURE: 124 MMHG | TEMPERATURE: 98 F | OXYGEN SATURATION: 96 %

## 2018-07-10 ENCOUNTER — RX RENEWAL (OUTPATIENT)
Age: 83
End: 2018-07-10

## 2018-07-16 ENCOUNTER — APPOINTMENT (OUTPATIENT)
Dept: UROLOGY | Facility: HOSPITAL | Age: 83
End: 2018-07-16

## 2018-07-16 RX ORDER — ASPIRIN ENTERIC COATED TABLETS 81 MG 81 MG/1
81 TABLET, DELAYED RELEASE ORAL
Qty: 90 | Refills: 3 | Status: ACTIVE | COMMUNITY
Start: 2017-10-13 | End: 1900-01-01

## 2018-07-18 ENCOUNTER — RX RENEWAL (OUTPATIENT)
Age: 83
End: 2018-07-18

## 2018-07-18 RX ORDER — BUPROPION HYDROCHLORIDE 100 MG/1
100 TABLET, FILM COATED ORAL
Qty: 60 | Refills: 0 | Status: ACTIVE | COMMUNITY
Start: 2017-05-07 | End: 1900-01-01

## 2018-07-24 PROBLEM — I48.91 AF (ATRIAL FIBRILLATION): Status: ACTIVE | Noted: 2017-11-16

## 2018-08-01 ENCOUNTER — MEDICATION RENEWAL (OUTPATIENT)
Age: 83
End: 2018-08-01

## 2018-08-01 RX ORDER — ALLOPURINOL 100 MG/1
100 TABLET ORAL
Qty: 30 | Refills: 3 | Status: ACTIVE | OUTPATIENT
Start: 2018-06-15

## 2018-08-07 ENCOUNTER — APPOINTMENT (OUTPATIENT)
Dept: ELECTROPHYSIOLOGY | Facility: CLINIC | Age: 83
End: 2018-08-07

## 2018-09-24 ENCOUNTER — APPOINTMENT (OUTPATIENT)
Dept: ELECTROPHYSIOLOGY | Facility: CLINIC | Age: 83
End: 2018-09-24

## 2018-11-22 NOTE — ED PROVIDER NOTE - NS ED ATTENDING STATEMENT MOD
Encephalopathy I have personally seen and examined this patient.  I have fully participated in the care of this patient. I have reviewed all pertinent clinical information, including history, physical exam, plan and the Resident’s note and agree except as noted.

## 2018-12-21 ENCOUNTER — APPOINTMENT (OUTPATIENT)
Dept: RADIOLOGY | Facility: HOSPITAL | Age: 83
End: 2018-12-21
Payer: MEDICARE

## 2018-12-21 ENCOUNTER — OUTPATIENT (OUTPATIENT)
Dept: OUTPATIENT SERVICES | Facility: HOSPITAL | Age: 83
LOS: 1 days | End: 2018-12-21

## 2018-12-21 ENCOUNTER — APPOINTMENT (OUTPATIENT)
Dept: SPEECH THERAPY | Facility: HOSPITAL | Age: 83
End: 2018-12-21
Payer: MEDICARE

## 2018-12-21 ENCOUNTER — OUTPATIENT (OUTPATIENT)
Dept: OUTPATIENT SERVICES | Facility: HOSPITAL | Age: 83
LOS: 1 days | Discharge: HOME | End: 2018-12-21

## 2018-12-21 DIAGNOSIS — R13.10 DYSPHAGIA, UNSPECIFIED: ICD-10-CM

## 2018-12-21 PROCEDURE — 74230 X-RAY XM SWLNG FUNCJ C+: CPT | Mod: 26

## 2018-12-31 DIAGNOSIS — R13.12 DYSPHAGIA, OROPHARYNGEAL PHASE: ICD-10-CM

## 2018-12-31 DIAGNOSIS — R13.13 DYSPHAGIA, PHARYNGEAL PHASE: ICD-10-CM

## 2023-10-17 NOTE — ED ADULT NURSE NOTE - PMH
Last OV 8/24  Next OV 11/27  Looks like he has completed 30 days of 0.5mg, so I am pending the 1mg to you, if appropriate.  
High cholesterol
